# Patient Record
Sex: MALE | Race: WHITE
[De-identification: names, ages, dates, MRNs, and addresses within clinical notes are randomized per-mention and may not be internally consistent; named-entity substitution may affect disease eponyms.]

---

## 2018-03-23 ENCOUNTER — HOSPITAL ENCOUNTER (EMERGENCY)
Dept: HOSPITAL 45 - C.EDB | Age: 23
Discharge: HOME | End: 2018-03-23
Payer: COMMERCIAL

## 2018-03-23 VITALS — TEMPERATURE: 98.42 F | OXYGEN SATURATION: 98 %

## 2018-03-23 VITALS — DIASTOLIC BLOOD PRESSURE: 64 MMHG | SYSTOLIC BLOOD PRESSURE: 119 MMHG | HEART RATE: 89 BPM | OXYGEN SATURATION: 99 %

## 2018-03-23 DIAGNOSIS — F10.929: Primary | ICD-10-CM
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
## 2018-03-23 LAB
BUN SERPL-MCNC: 19 MG/DL (ref 7–18)
CALCIUM SERPL-MCNC: 8.6 MG/DL (ref 8.5–10.1)
CO2 SERPL-SCNC: 24 MMOL/L (ref 21–32)
CREAT SERPL-MCNC: 1.05 MG/DL (ref 0.6–1.4)
GLUCOSE SERPL-MCNC: 102 MG/DL (ref 70–99)
POTASSIUM SERPL-SCNC: 3.6 MMOL/L (ref 3.5–5.1)
SODIUM SERPL-SCNC: 138 MMOL/L (ref 136–145)

## 2018-03-23 NOTE — EMERGENCY ROOM VISIT NOTE
History


First contact with patient:  00:34


Chief Complaint:  ALCOHOL OVERDOSE


Stated Complaint:  ALCOHOL OVERDOSE


Nursing Triage Summary:  


Pt arrived via [facility] from [facility] with PD escort. Per EMS, pt 


found by [employer] 'stumbling' down [address]. Pt stopped 

by 


PD and EMS called for pt evaluation. EMS reports that the pt was uncooperative 


with staff and did not answer questions. 











Pt told RN that he "drank beer" tonight. Denies drug use. No vomiting or 


incontinence.





History of Present Illness


The patient is a 22 year old male who presents to the Emergency Room with 

complaints of alcohol intoxication who was found by the police stumbling 

[place].  Patient states he has had a lot of alcohol.  No drugs.  Patient 

denies fall, chest pain, dyspnea, abdominal pain or any other medical 

complaints.





Review of Systems


An 10 system review of systems was completed with positives and pertinent 

negatives listed in the HPI.





Past Medical/Surgical History


None





Social History


Smoking Status:  Unknown if Ever Smoked


Smokeless Tobacco Use:  No


Alcohol Use:  occasionally


Drug Use:  none


Occupation Status:  [employer]





Current/Historical Medications


Unable to Obtain Active Prescriptions or Reported Meds





Physical Exam


Vital Signs











  Date Time  Temp Pulse Resp B/P (MAP) Pulse Ox O2 Delivery O2 Flow Rate FiO2


 


3/23/18 04:30  88      


 


3/23/18 04:01        


 


3/23/18 03:59  89 25  93   


 


3/23/18 03:31    89/51    


 


3/23/18 03:29  82 20  94   


 


3/23/18 03:01        


 


3/23/18 02:59  84 21  95   


 


3/23/18 02:31    96/53    


 


3/23/18 02:29  86 20  95   


 


3/23/18 02:09  68 14 101/45 96 Room Air  


 


3/23/18 02:08    82/43    


 


3/23/18 00:59  130 20     


 


3/23/18 00:54  107 23  96   


 


3/23/18 00:35  107      


 


3/23/18 00:33    159/84    


 


3/23/18 00:24 36.9 108 26 159/84 98 Room Air  


 


3/23/18 00:24     98 Room Air  











Physical Exam











PHYSICAL EXAM: VITALS: Vitals are noted on the nurse's note and reviewed by 

myself.  Vital signs stable.


GENERAL: Pleasant male with EtOH odor, in no acute distress, nondiaphoretic, 

well-developed well-nourished.  The patient is visibly intoxicated.


SKIN: The skin was without obvious lacerations, abrasions, or rashes.  There is 

no tenting of the skin.  Capillary reflex less than 2 seconds.


HEENT: Normocephalic, atraumatic.  PERRLA.  EOMI.  Conjunctiva with mild 

injection without icterus.  Tympanic membranes without erythema or effusion 

bilaterally no hemotympanum.  External auditory canals are clear.  Nares patent 

bilaterally.  No epistaxis.  Oropharynx without erythema or exudate.  Uvula 

midline.  Oral mucosal moist.  No lymphadenopathy.  Neck is supple without 

cervical spine tenderness.


HEART: Regular rate and rhythm without murmurs gallops or rubs.  Peripheral 

pulses 2+.


LUNGS: Clear to auscultation bilaterally without wheezes, rales or rhonchi.  


ABDOMEN: Positive bowel sounds x 4.  Normal tympanic percussion.  Soft, 

nontender, without masses or organomegaly.


MUSCULOSKELETAL: Gross motor function of the upper and lower extremities 

intact.  The patient has a staggering gait.


NEUROLOGIC:  The patient is visibly intoxicated.  Once they were more sober 

they were alert and oriented to person place and time.





Medical Decision & Procedures


Laboratory Results


3/23/18 00:34

















Test


  3/23/18


00:34


 


Anion Gap


  10.0 mmol/L


(3-11)


 


Estimated GFR (


American) 117.9 


 


 


Estimated GFR (Non-


American 101.7 


 


 


BUN/Creatinine Ratio 18.0 (10-20) 


 


Calcium Level


  8.6 mg/dl


(8.5-10.1)


 


Ethyl Alcohol mg/dL


  325.0 mg/dl


(0-3)











ED Course


Prior records/ancillary studies reviewed.


Triage Nursing notes reviewed.


Additional history obtained from EMS.





The patient's history was concerning for altered mental status and a possible 

alcohol overdose.





Differential diagnosis:


Etiologies such as alcohol intoxication, toxicologic, infection, hypoglycemia, 

electrolyte abnormalities, cardiac sources, intracerebral event, neurologic, as 

well as others were entertained.





Physical examination:


As above.  The patient is clinically intoxicated.  no trauma noted.





ER treatment provided:


Monitoring


Aspiration precautions


The patient was frequently reassessed.





Diagnostic interpretation by me:


Cardiac monitoring did not reveal any evidence of dysrhythmia.





The labs  reviewed. The patient's blood alcohol level was 325 mg/dL.








The patient's history was reviewed once they were more coherent and their 

intoxication cleared. The patient states they have been in good health recently 

and had no medical complaints. The patient admitted to consuming alcohol.  No 

additional concerning findings were noted. The patient complained of no 

symptoms to suggest assault.





This appears to be consistent with an isolated overdose of alcohol.





By the evaluation outlined above emergent etiologies such as trauma, infection, 

hypoglycemia, electrolyte abnormalities, cardiac sources, intracerebral event, 

neurologic,as well as others were deemed relatively unlikely.





The patient was informed about the findings as listed above. The patient was 

counseled on the dangers of excessive alcohol use.  I gave my usual and 

customary discussion regarding this issue.  All questions were answered and the 

patient was pleased with the treatment. Return instructions were outlined and 

the patient was discharged in stable condition once their mental status 

improved and a safe destination was confirmed.





Outpatient prescription management:


None





Referral:


The patient was referred back to their primary care physician for follow-up in 

2 to 3 days for a recheck of their current condition.





Medical Decision


as above





Medication Reconcilliation


Current Medication List:  was personally reviewed by me





Blood Pressure Screening


Patient's blood pressure:  Normal blood pressure





Impression





 Primary Impression:  


 Alcohol abuse





Departure Information


Dispostion


Home / Self-Care





Condition


GOOD





Prescriptions





Unable to Obtain Active Prescriptions or Reported Meds





Patient Instructions


My [facility] [id number]





Additional Instructions





Keep well-hydrated.  Tylenol every 6 hours as needed for pain (Maximum 3000 mg 

Tylenol in 24 hr period).  





Follow up with family doctor and/or health services as needed.





No driving for the next 24 hours.





Recommend no alcohol for the next 48 hours and avoid binge drinking in the 

future.





Return to ER sooner for chest pain, abdominal pain, worsening signs or symptoms 

or as needed.